# Patient Record
(demographics unavailable — no encounter records)

---

## 2025-01-14 NOTE — CONSULT LETTER
[Dear  ___] : Dear  [unfilled], [Consult Letter:] : I had the pleasure of evaluating your patient, [unfilled]. [FreeTextEntry1] : The patient was seen in hand consultation today. A copy of my office note is enclosed for your review with the patient's knowledge and consent.  Sincerely,  Jerson Feldman MD Chief, Hand Surgery Residency  ([phone]) Department of Orthopaedic Surgery  [facility] Professor of Orthopaedic Surgery Scott MARTINO at [facility]

## 2025-01-14 NOTE — PHYSICAL EXAM
[de-identified] : Left wrist Full motion no localizing findings Left hand  index A1 pulley slight tenderness Index active triggering without pain There is no other A1 pulley tenderness or triggering in any other finger, left hand. No pertinent CMC, MP, PIP, or DIP joint contributory finding.  Right wrist Full motion without localizing findings Right hand no pertinent CMC, MP, PIP, or DIP joint contributory finding. No A1 pulley tenderness and no triggering in any finger.  Neurologic: Median, ulnar, and radial motor and sensory are intact.  Skin: No cyanosis, clubbing, or rashes. Vascular: Radial pulses intact. Lymphatic: No streaking or epitrochlear adenopathy. The patient is awake, alert, and oriented. Affect appropriate. Cooperative.

## 2025-01-14 NOTE — CONSULT LETTER
[Dear  ___] : Dear  [unfilled], [Consult Letter:] : I had the pleasure of evaluating your patient, [unfilled]. [FreeTextEntry1] : The patient was seen in hand consultation today. A copy of my office note is enclosed for your review with the patient's knowledge and consent.  Sincerely,  Jerson Feldman MD Chief, Hand Surgery Residency  ([phone]) Department of Orthopaedic Surgery  [facility] Professor of Orthopaedic Surgery Scott MARTINO at [facility]

## 2025-01-14 NOTE — PHYSICAL EXAM
[de-identified] : Left wrist Full motion no localizing findings Left hand  index A1 pulley slight tenderness Index active triggering without pain There is no other A1 pulley tenderness or triggering in any other finger, left hand. No pertinent CMC, MP, PIP, or DIP joint contributory finding.  Right wrist Full motion without localizing findings Right hand no pertinent CMC, MP, PIP, or DIP joint contributory finding. No A1 pulley tenderness and no triggering in any finger.  Neurologic: Median, ulnar, and radial motor and sensory are intact.  Skin: No cyanosis, clubbing, or rashes. Vascular: Radial pulses intact. Lymphatic: No streaking or epitrochlear adenopathy. The patient is awake, alert, and oriented. Affect appropriate. Cooperative.

## 2025-01-14 NOTE — HISTORY OF PRESENT ILLNESS
[FreeTextEntry1] : The patient is a 57 yo RHD male who presents as a NEW PATIENT for hand evaluation. "Ro-tayla" Pt is  at [facility]. Hx of DM II PCP Holli Deluna The patient has had pain left index triggering approximately 6 months duration. Patient has seen [facility] hand surgeon.  The patient does not recall the name of the hand surgeon. Patient was treated with 2 cortisone injections. Pt relates that he was told that he should have a cortisone every 4 months is triggering continues. First injection in September 2024.  Triggering did not fully resolve.   The patient then had a second injection December 2024. Triggering is continued but the patient has no pain at this time. Patient presents today for second opinion consultation and also explains that he is considering transfer of care. Patient denies other trigger fingers. Patient denies numbness or tingling in fingers. Patient has no other active hand complaints.

## 2025-01-14 NOTE — ASSESSMENT
[FreeTextEntry1] : Patient has trigger finger left index finger approximately 6-month duration.  Patient had cortisone injection in September.  Patient states that the triggering did not completely stop.  Patient had another cortisone injection in December.  Triggering continues but patient has no pain. Patient states that the hand surgeon that he had seen had told him that he should have a cortisone injection every 4 months and if triggering continues then surgery. Patient states that he is looking to transfer care to me. I have had a discussion with patient regarding possible need for surgery.  Because triggering did not subside after the first or second injection, I would not normally support a third injection.  Certainly, if the patient would like to have a third cortisone injection it will be administered at his request. If painless triggering continues and patient is comfortable, no further treatment necessary. If patient has pain especially painful triggering then he should return for reevaluation. Patient will be reassessed and decision made at that time how to proceed. I have advised patient that if he has ongoing triggering without relief from 2 cortisone injections I would ordinarily recommend trigger release surgery.  I initiated discussion with patient regarding trigger finger release at an [facility] with routine PST and clearance versus trigger release utilizing WALANT anesthesia. Prognosis uncertain A lengthy and detailed discussion was held with the patient regarding analysis, treatment, and recommendations. All questions have been answered. At the conclusion the patient expressed acceptance, understanding and agreement with the plan.

## 2025-01-14 NOTE — ASSESSMENT
[FreeTextEntry1] : Patient has trigger finger left index finger approximately 6-month duration.  Patient had cortisone injection in September.  Patient states that the triggering did not completely stop.  Patient had another cortisone injection in December.  Triggering continues but patient has no pain. Patient states that the hand surgeon that he had seen had told him that he should have a cortisone injection every 4 months and if triggering continues then surgery. Patient states that he is looking to transfer care to me. I have had a discussion with patient regarding possible need for surgery.  Because triggering did not subside after the first or second injection, I would not normally support a third injection.  Certainly, if the patient would like to have a third cortisone injection it will be administered at his request. If painless triggering continues and patient is comfortable, no further treatment necessary. If patient has pain especially painful triggering then he should return for reevaluation. Patient will be reassessed and decision made at that time how to proceed. I have advised patient that if he has ongoing triggering without relief from 2 cortisone injections I would ordinarily recommend trigger release surgery.  I initiated discussion with patient regarding trigger finger release at an [facility] with routine PST and clearance versus trigger release utilizing WALANT anesthesia. Prognosis uncertain A lengthy and detailed discussion was held with the patient regarding analysis, treatment, and recommendations. All questions have been answered. At the conclusion the patient expressed acceptance, understanding and agreement with the plan.

## 2025-01-14 NOTE — HISTORY OF PRESENT ILLNESS
[FreeTextEntry1] : The patient is a 59 yo RHD male who presents as a NEW PATIENT for hand evaluation. "Ro-tayla" Pt is  at [facility]. Hx of DM II PCP Holli Deluna The patient has had pain left index triggering approximately 6 months duration. Patient has seen [facility] hand surgeon.  The patient does not recall the name of the hand surgeon. Patient was treated with 2 cortisone injections. Pt relates that he was told that he should have a cortisone every 4 months is triggering continues. First injection in September 2024.  Triggering did not fully resolve.   The patient then had a second injection December 2024. Triggering is continued but the patient has no pain at this time. Patient presents today for second opinion consultation and also explains that he is considering transfer of care. Patient denies other trigger fingers. Patient denies numbness or tingling in fingers. Patient has no other active hand complaints.

## 2025-01-24 NOTE — ASSESSMENT
[FreeTextEntry1] : 58 year old male found to have stable Hypertension, Type 2 Diabetes Mellitus, CAD, Hyperlipidemia, with the current prescription regimen as recommended, diet and lifestyle modifications, as counseled. Prior results reviewed, interpreted and discussed with the patient during today's examination, as appropriate. Follow up, treatment plan and tests, as ordered.   Flu/TDAp/PNA/Shingles vaccinations as feasible in the future, as per prior history of immunization.

## 2025-01-24 NOTE — HEALTH RISK ASSESSMENT
[Patient reported colonoscopy was normal] : Patient reported colonoscopy was normal [HIV test declined] : HIV test declined [Hepatitis C test offered] : Hepatitis C test offered [Good] : ~his/her~  mood as  good [No] : In the past 12 months have you used drugs other than those required for medical reasons? No [No falls in past year] : Patient reported no falls in the past year [0] : 2) Feeling down, depressed, or hopeless: Not at all (0) [None] : None [Feels Safe at Home] : Feels safe at home [Fully functional (bathing, dressing, toileting, transferring, walking, feeding)] : Fully functional (bathing, dressing, toileting, transferring, walking, feeding) [Fully functional (using the telephone, shopping, preparing meals, housekeeping, doing laundry, using] : Fully functional and needs no help or supervision to perform IADLs (using the telephone, shopping, preparing meals, housekeeping, doing laundry, using transportation, managing medications and managing finances) [Smoke Detector] : smoke detector [Carbon Monoxide Detector] : carbon monoxide detector [Seat Belt] :  uses seat belt [Sunscreen] : uses sunscreen [With Patient/Caregiver] : , with patient/caregiver [Never] : Never [NO] : No [FreeTextEntry1] : Check up  [de-identified] : CARD/ENDO [[id number]] : 0 [Change in mental status noted] : No change in mental status noted [Reports changes in hearing] : Reports no changes in hearing [Reports changes in vision] : Reports no changes in vision [Reports changes in dental health] : Reports no changes in dental health [ColonoscopyDate] : 01/16 [AdvancecareDate] : 01/25

## 2025-01-24 NOTE — HEALTH RISK ASSESSMENT
[Patient reported colonoscopy was normal] : Patient reported colonoscopy was normal [HIV test declined] : HIV test declined [Hepatitis C test offered] : Hepatitis C test offered [Good] : ~his/her~  mood as  good [No] : In the past 12 months have you used drugs other than those required for medical reasons? No [No falls in past year] : Patient reported no falls in the past year [0] : 2) Feeling down, depressed, or hopeless: Not at all (0) [None] : None [Feels Safe at Home] : Feels safe at home [Fully functional (bathing, dressing, toileting, transferring, walking, feeding)] : Fully functional (bathing, dressing, toileting, transferring, walking, feeding) [Fully functional (using the telephone, shopping, preparing meals, housekeeping, doing laundry, using] : Fully functional and needs no help or supervision to perform IADLs (using the telephone, shopping, preparing meals, housekeeping, doing laundry, using transportation, managing medications and managing finances) [Smoke Detector] : smoke detector [Carbon Monoxide Detector] : carbon monoxide detector [Seat Belt] :  uses seat belt [Sunscreen] : uses sunscreen [With Patient/Caregiver] : , with patient/caregiver [Never] : Never [NO] : No [FreeTextEntry1] : Check up  [de-identified] : CARD/ENDO [[id number]] : 0 [Change in mental status noted] : No change in mental status noted [Reports changes in hearing] : Reports no changes in hearing [Reports changes in vision] : Reports no changes in vision [Reports changes in dental health] : Reports no changes in dental health [ColonoscopyDate] : 01/16 [AdvancecareDate] : 01/25

## 2025-01-24 NOTE — HEALTH RISK ASSESSMENT
[Patient reported colonoscopy was normal] : Patient reported colonoscopy was normal [HIV test declined] : HIV test declined [Hepatitis C test offered] : Hepatitis C test offered [Good] : ~his/her~  mood as  good [No] : In the past 12 months have you used drugs other than those required for medical reasons? No [No falls in past year] : Patient reported no falls in the past year [0] : 2) Feeling down, depressed, or hopeless: Not at all (0) [None] : None [Feels Safe at Home] : Feels safe at home [Fully functional (bathing, dressing, toileting, transferring, walking, feeding)] : Fully functional (bathing, dressing, toileting, transferring, walking, feeding) [Fully functional (using the telephone, shopping, preparing meals, housekeeping, doing laundry, using] : Fully functional and needs no help or supervision to perform IADLs (using the telephone, shopping, preparing meals, housekeeping, doing laundry, using transportation, managing medications and managing finances) [Smoke Detector] : smoke detector [Carbon Monoxide Detector] : carbon monoxide detector [Seat Belt] :  uses seat belt [Sunscreen] : uses sunscreen [With Patient/Caregiver] : , with patient/caregiver [Never] : Never [NO] : No [FreeTextEntry1] : Check up  [de-identified] : CARD/ENDO [[id number]] : 0 [Change in mental status noted] : No change in mental status noted [Reports changes in hearing] : Reports no changes in hearing [Reports changes in vision] : Reports no changes in vision [Reports changes in dental health] : Reports no changes in dental health [ColonoscopyDate] : 01/16 [AdvancecareDate] : 01/25

## 2025-01-24 NOTE — HISTORY OF PRESENT ILLNESS
[de-identified] : 58 year old male patient with history of stable Hypertension, Type 2 Diabetes Mellitus, CAD, Hyperlipidemia, history as stated, presented for an initial annual preventative examination.

## 2025-01-24 NOTE — PHYSICAL EXAM
[TextEntry] : CONSTITUTIONAL:  No acute distress, well developed and well appearing. EYES:  Normal sclera/conjunctiva, PERRLA, EOMI. ENT:  Normal outer ears/nose, normal oropharynx. NECK:  No JVD, supple, thyroid normal/no nodules, no lymphadenopathy. PULMONARY:  No respiratory distress, clear to auscultation, no accessory muscle use. CARDIAC:  Normal rate, regular rhythm, normal S1/S2, no murmur. VASCULAR:  No carotid bruits, no abdominal bruit, no varicosities, pedal pulses present, no edema, no extremity clubbing/cyanosis. ABDOMEN:  Normoactive bowel sounds, soft and nontender, no hepatosplenomegaly or masses appreciated. BACK:  No CVA tenderness, no spinal tenderness. MUSCULOSKELETAL:  No joint swelling, grossly normal strength/tone. SKIN:  No rash, normal turgor, left ankle skin lesion, S/P Bx - Verruca Vulgaris, GEN SX F/U for possible excision, as counseled. NEUROLOGY:  Normal gait and coordination, no focal deficits.

## 2025-01-24 NOTE — HISTORY OF PRESENT ILLNESS
[de-identified] : 58 year old male patient with history of stable Hypertension, Type 2 Diabetes Mellitus, CAD, Hyperlipidemia, history as stated, presented for an initial annual preventative examination.

## 2025-01-24 NOTE — HISTORY OF PRESENT ILLNESS
[de-identified] : 58 year old male patient with history of stable Hypertension, Type 2 Diabetes Mellitus, CAD, Hyperlipidemia, history as stated, presented for an initial annual preventative examination.

## 2025-01-29 NOTE — CONSULT LETTER
[Dear  ___] : Dear  [unfilled], [Consult Letter:] : I had the pleasure of evaluating your patient, [unfilled]. [Consult Closing:] : Thank you very much for allowing me to participate in the care of this patient.  If you have any questions, please do not hesitate to contact me. [Sincerely,] : Sincerely, [FreeTextEntry3] : Syed Fernandez MD General Surgery

## 2025-01-29 NOTE — HISTORY OF PRESENT ILLNESS
[de-identified] : Mr. ZAMUDIO is a 58 year y/o M w PMH HTN, T2DM, CAD, HLD- referred for consult visit by PCP, Dr. Viramontes for evaluation of skin lesion of the left ankle.

## 2025-02-19 NOTE — HISTORY OF PRESENT ILLNESS
[de-identified] : Mr. ZAMUDIO is a 58 year y/o M w PMH HTN, T2DM, CAD, HLD- referred for consult visit by PCP, Dr. Viramontes for evaluation of skin lesion of the left ankle.

## 2025-02-19 NOTE — CONSULT LETTER
[FreeTextEntry3] : Syed Fernandez MD General Surgery
Discharge

## 2025-02-19 NOTE — HISTORY OF PRESENT ILLNESS
[de-identified] : Mr. ZAMUDIO is a 58 year y/o M w PMH HTN, T2DM, CAD, HLD- referred for consult visit by PCP, Dr. Viramontes for evaluation of skin lesion of the left ankle.

## 2025-02-19 NOTE — HISTORY OF PRESENT ILLNESS
[de-identified] : Mr. ZAMUDIO is a 58 year y/o M w PMH HTN, T2DM, CAD, HLD- referred for consult visit by PCP, Dr. Viramontes for evaluation of skin lesion of the left ankle.

## 2025-04-10 NOTE — ASSESSMENT
[FreeTextEntry1] : 59 year old male found to have stable Hypertension, Type 2 Diabetes Mellitus, CAD, Hyperlipidemia, with the current prescription regimen as recommended, diet and lifestyle modifications, as counseled. Prior results reviewed, interpreted and discussed with the patient during today's examination, as appropriate. Follow up, treatment plan and tests, as ordered.   EKG: Sinus Rhythm @ 69 BPM. No acute ST-T changes noted.  Total time spent:: 25 minutes Including: Preparation prior to visit - Reviewing prior record, results of tests and Consultation Reports as applicable Conducting an appropriate H & P during today's encounter Appropriate orders for tests, medications and procedures, as applicable Counseling patient Note completion

## 2025-04-10 NOTE — HEALTH RISK ASSESSMENT
[No] : In the past 12 months have you used drugs other than those required for medical reasons? No [No falls in past year] : Patient reported no falls in the past year [0] : 2) Feeling down, depressed, or hopeless: Not at all (0) [Never] : Never [de-identified] : GEN SX [[id number]] : 0

## 2025-04-10 NOTE — HISTORY OF PRESENT ILLNESS
[de-identified] : 59 year old  male patient with history of stable Hypertension, Type 2 Diabetes Mellitus, CAD, Hyperlipidemia, history as stated, presented for follow up examination. Patient is compliant with all medications. ROS as stated.

## 2025-06-19 NOTE — PHYSICAL EXAM
[2+] : left foot dorsalis pedis 2+ [No Joint Swelling] : no joint swelling [Normal Foot/Ankle] : Both lower extremities were exposed and visualized. Standing exam demonstrates normal foot posture and alignment. Hindfoot exam shows no hindfoot valgus or varus [Skin Color & Pigmentation] : normal skin color and pigmentation [Skin Turgor] : normal skin turgor [Sensation] : the sensory exam was normal to light touch and pinprick [No Focal Deficits] : no focal deficits [Deep Tendon Reflexes (DTR)] : deep tendon reflexes were 2+ and symmetric [Motor Exam] : the motor exam was normal [General Appearance - Alert] : alert [General Appearance - Well-Appearing] : healthy appearing [Oriented To Time, Place, And Person] : oriented to person, place, and time [Ankle Swelling (On Exam)] : not present [Varicose Veins Of Lower Extremities] : not present [] : not present [Delayed in the Right Toes] : capillary refills normal in right toes [Delayed in the Left Toes] : capillary refills normal in the left toes [de-identified] : Forefoot varus, fully compensated.  Pain in the retrocalcaneal area of both feet.  Some tightness in the Achilles tendon, bilateral.   [Foot Ulcer] : no foot ulcer [Skin Induration] : no skin induration [FreeTextEntry1] : Xerotic skin noted to the plantar aspect of the foot.

## 2025-06-19 NOTE — CONSULT LETTER
[Dear  ___] : Dear  [unfilled], [Courtesy Letter:] : I had the pleasure of seeing your patient, [unfilled], in my office today. [Please see my note below.] : Please see my note below. [Consult Closing:] : Thank you very much for allowing me to participate in the care of this patient.  If you have any questions, please do not hesitate to contact me. [Sincerely,] : Sincerely, [FreeTextEntry2] : Cristi Viramontes MD [address] [place] [address]  [FreeTextEntry3] : Charles M. Lombardi, DPM, FACFAS Systems Chief, Podiatric Services [facility] Assistant Professor of Surgery [facility] [employer] at [facility]

## 2025-06-19 NOTE — ASSESSMENT
[FreeTextEntry1] : Impression: Bilateral calcaneal spurs (M77.31, M77.32).  Insertional Achilles tendonitis, bilateral (M76.61).  Pain in both feet (M79.671, M79.672).  Enthesopathy (M77.9).  Dry skin (L85.3).  Treatment: Patient was placed into heel lifts.  He was given Meloxicam.  Patient is going away for 3 weeks.  Patient is aware that if the pain worsens, he may have to consider immobilization in a Cam boot. Patient was given Ammonium Lactate to be used on the dry skin and instructed on its proper use.   Will reevaluate if pain persists.  .qcft

## 2025-06-19 NOTE — CONSULT LETTER
[Dear  ___] : Dear  [unfilled], [Courtesy Letter:] : I had the pleasure of seeing your patient, [unfilled], in my office today. [Please see my note below.] : Please see my note below. [Consult Closing:] : Thank you very much for allowing me to participate in the care of this patient.  If you have any questions, please do not hesitate to contact me. [Sincerely,] : Sincerely, [FreeTextEntry2] : Cristi Viramontes MD [address] [place] [address]  [FreeTextEntry3] : Charles M. Lombardi, DPM, FACFAS Systems Chief, Podiatric Services [facility] Assistant Professor of Surgery [facility] [employer] at [facility]

## 2025-06-19 NOTE — HISTORY OF PRESENT ILLNESS
[FreeTextEntry1] : Patient presents today with a chief complaint of bilateral heel pain, in both feet, retrocalcaneally that has been present for several weeks.  Patient has difficulty ambulating due to pain and discomfort.  There has been some improvement.  The left foot retrocalcaneal and right foot retrocalcaneal, also has some fissuring on the plantar aspect of the foot with dry skin.

## 2025-06-19 NOTE — PHYSICAL EXAM
[2+] : left foot dorsalis pedis 2+ [No Joint Swelling] : no joint swelling [Normal Foot/Ankle] : Both lower extremities were exposed and visualized. Standing exam demonstrates normal foot posture and alignment. Hindfoot exam shows no hindfoot valgus or varus [Skin Color & Pigmentation] : normal skin color and pigmentation [Skin Turgor] : normal skin turgor [Sensation] : the sensory exam was normal to light touch and pinprick [No Focal Deficits] : no focal deficits [Deep Tendon Reflexes (DTR)] : deep tendon reflexes were 2+ and symmetric [Motor Exam] : the motor exam was normal [General Appearance - Alert] : alert [General Appearance - Well-Appearing] : healthy appearing [Oriented To Time, Place, And Person] : oriented to person, place, and time [Ankle Swelling (On Exam)] : not present [Varicose Veins Of Lower Extremities] : not present [] : not present [Delayed in the Right Toes] : capillary refills normal in right toes [Delayed in the Left Toes] : capillary refills normal in the left toes [de-identified] : Forefoot varus, fully compensated.  Pain in the retrocalcaneal area of both feet.  Some tightness in the Achilles tendon, bilateral.   [Foot Ulcer] : no foot ulcer [Skin Induration] : no skin induration [FreeTextEntry1] : Xerotic skin noted to the plantar aspect of the foot.

## 2025-06-19 NOTE — PROCEDURE
[FreeTextEntry1] : X-rays were taken. (3 views - bilateral ankles) There is a large retrocalcaneal spur, bilateral.  No evidence of any fracture/dislocation.
